# Patient Record
Sex: MALE | Race: WHITE | URBAN - NONMETROPOLITAN AREA
[De-identification: names, ages, dates, MRNs, and addresses within clinical notes are randomized per-mention and may not be internally consistent; named-entity substitution may affect disease eponyms.]

---

## 2022-07-18 ENCOUNTER — OFFICE VISIT (OUTPATIENT)
Dept: URBAN - NONMETROPOLITAN AREA CLINIC 2 | Facility: CLINIC | Age: 42
End: 2022-07-18

## 2022-10-24 ENCOUNTER — TELEPHONE ENCOUNTER (OUTPATIENT)
Dept: URBAN - NONMETROPOLITAN AREA CLINIC 2 | Facility: CLINIC | Age: 42
End: 2022-10-24

## 2022-11-17 ENCOUNTER — LAB OUTSIDE AN ENCOUNTER (OUTPATIENT)
Dept: URBAN - NONMETROPOLITAN AREA CLINIC 2 | Facility: CLINIC | Age: 42
End: 2022-11-17

## 2022-11-17 ENCOUNTER — WEB ENCOUNTER (OUTPATIENT)
Dept: URBAN - NONMETROPOLITAN AREA CLINIC 2 | Facility: CLINIC | Age: 42
End: 2022-11-17

## 2022-11-17 ENCOUNTER — OFFICE VISIT (OUTPATIENT)
Dept: URBAN - NONMETROPOLITAN AREA CLINIC 2 | Facility: CLINIC | Age: 42
End: 2022-11-17
Payer: COMMERCIAL

## 2022-11-17 ENCOUNTER — TELEPHONE ENCOUNTER (OUTPATIENT)
Dept: URBAN - NONMETROPOLITAN AREA CLINIC 2 | Facility: CLINIC | Age: 42
End: 2022-11-17

## 2022-11-17 VITALS
HEART RATE: 94 BPM | TEMPERATURE: 97.9 F | SYSTOLIC BLOOD PRESSURE: 155 MMHG | DIASTOLIC BLOOD PRESSURE: 93 MMHG | HEIGHT: 70 IN | WEIGHT: 285 LBS | BODY MASS INDEX: 40.8 KG/M2

## 2022-11-17 DIAGNOSIS — R15.9 FULL INCONTINENCE OF FECES: ICD-10-CM

## 2022-11-17 DIAGNOSIS — Z12.11 COLON CANCER SCREENING: ICD-10-CM

## 2022-11-17 DIAGNOSIS — C62.90 MALIGNANT NEOPLASM OF TESTICLE, UNSPECIFIED LATERALITY, UNSPECIFIED WHETHER DESCENDED OR UNDESCENDED: ICD-10-CM

## 2022-11-17 PROBLEM — 363449006: Status: ACTIVE | Noted: 2022-11-17

## 2022-11-17 PROCEDURE — 99244 OFF/OP CNSLTJ NEW/EST MOD 40: CPT | Performed by: NURSE PRACTITIONER

## 2022-11-17 RX ORDER — LISINOPRIL 20 MG/1
1 TABLET TABLET ORAL ONCE A DAY
Status: ACTIVE | COMMUNITY

## 2022-11-17 RX ORDER — DEXTROAMPHETAMINE SACCHARATE, AMPHETAMINE ASPARTATE, DEXTROAMPHETAMINE SULFATE, AND AMPHETAMINE SULFATE 5; 5; 5; 5 MG/1; MG/1; MG/1; MG/1
1 TABLET TABLET ORAL TWICE A DAY
Status: ACTIVE | COMMUNITY

## 2022-11-17 RX ORDER — DEXTROAMPHETAMINE SACCHARATE, AMPHETAMINE ASPARTATE MONOHYDRATE, DEXTROAMPHETAMINE SULFATE, AND AMPHETAMINE SULFATE 5; 5; 5; 5 MG/1; MG/1; MG/1; MG/1
CAPSULE, EXTENDED RELEASE ORAL
Qty: 30 CAPSULE | Status: ACTIVE | COMMUNITY

## 2022-11-17 RX ORDER — SODIUM, POTASSIUM,MAG SULFATES 17.5-3.13G
354 ML AS DIRECTED SOLUTION, RECONSTITUTED, ORAL ORAL ONCE
Qty: 354 MILLILITER | Refills: 0 | OUTPATIENT
Start: 2022-11-17 | End: 2022-11-18

## 2022-11-17 RX ORDER — DEXTROAMPHETAMINE SACCHARATE, AMPHETAMINE ASPARTATE, DEXTROAMPHETAMINE SULFATE, AND AMPHETAMINE SULFATE 2.5; 2.5; 2.5; 2.5 MG/1; MG/1; MG/1; MG/1
1 TABLET TABLET ORAL TWICE A DAY
Status: ACTIVE | COMMUNITY

## 2022-11-17 NOTE — HPI-TODAY'S VISIT:
11/17/2022 Lewis is a 42-year-old male referred to me for consultation of diarrhea and fecal incontinence.  A copy this note be sent to the referring physician.  He states last year he was diagnosed and treated for testicular cancer.  During his treatment and evaluation he underwent retroperitoneal lymph node dissection with perianal resection.  Since his surgery, he has had fecal smearing.  He has intermittent urgency with diarrhea and incontinence at times.  Sometimes will be sitting at his desk with formed stool incontinence.  He is never had a colonoscopy.  He does not take anything for his bowels.  He has cut back on the amount of caffeine he was drinking.  Today his main complaint is fecal incontinence.  MB

## 2022-11-18 LAB
A/G RATIO: 1.5
ABSOLUTE BASOPHILS: 30
ABSOLUTE EOSINOPHILS: 89
ABSOLUTE LYMPHOCYTES: 1404
ABSOLUTE MONOCYTES: 507
ABSOLUTE NEUTROPHILS: 3870
ALBUMIN: 4.3
ALKALINE PHOSPHATASE: 59
ALT (SGPT): 52
AST (SGOT): 22
BASOPHILS: 0.5
BILIRUBIN, TOTAL: 0.5
BUN/CREATININE RATIO: 12
BUN: 19
C-REACTIVE PROTEIN, QUANT: 18
CALCIUM: 9.4
CARBON DIOXIDE, TOTAL: 27
CHLORIDE: 102
CREATININE: 1.65
EGFR: 52
EOSINOPHILS: 1.5
GLOBULIN, TOTAL: 2.8
GLUCOSE: 70
HEMATOCRIT: 39.6
HEMOGLOBIN: 13.7
IMMUNOGLOBULIN A: 240
INTERPRETATION: (no result)
LYMPHOCYTES: 23.8
MCH: 32.2
MCHC: 34.6
MCV: 93
MONOCYTES: 8.6
MPV: 9.4
NEUTROPHILS: 65.6
PLATELET COUNT: 232
POTASSIUM: 4.7
PROTEIN, TOTAL: 7.1
RDW: 12.3
RED BLOOD CELL COUNT: 4.26
SED RATE BY MODIFIED: 31
SODIUM: 139
TISSUE TRANSGLUTAMINASE AB, IGA: <1
TSH: 2.37
WHITE BLOOD CELL COUNT: 5.9

## 2022-11-21 ENCOUNTER — LAB OUTSIDE AN ENCOUNTER (OUTPATIENT)
Dept: URBAN - METROPOLITAN AREA CLINIC 92 | Facility: CLINIC | Age: 42
End: 2022-11-21

## 2022-11-21 ENCOUNTER — TELEPHONE ENCOUNTER (OUTPATIENT)
Dept: URBAN - METROPOLITAN AREA CLINIC 92 | Facility: CLINIC | Age: 42
End: 2022-11-21

## 2022-12-06 ENCOUNTER — LAB OUTSIDE AN ENCOUNTER (OUTPATIENT)
Dept: URBAN - NONMETROPOLITAN AREA CLINIC 2 | Facility: CLINIC | Age: 42
End: 2022-12-06

## 2022-12-07 LAB
IMMUNOGLOBULIN A: 223
INTERPRETATION: (no result)
SED RATE BY MODIFIED: 6
TISSUE TRANSGLUTAMINASE AB, IGA: <1

## 2023-02-22 ENCOUNTER — TELEPHONE ENCOUNTER (OUTPATIENT)
Dept: URBAN - NONMETROPOLITAN AREA CLINIC 2 | Facility: CLINIC | Age: 43
End: 2023-02-22

## 2023-02-24 ENCOUNTER — OFFICE VISIT (OUTPATIENT)
Dept: URBAN - NONMETROPOLITAN AREA SURGERY CENTER 1 | Facility: SURGERY CENTER | Age: 43
End: 2023-02-24
Payer: COMMERCIAL

## 2023-02-24 ENCOUNTER — CLAIMS CREATED FROM THE CLAIM WINDOW (OUTPATIENT)
Dept: URBAN - METROPOLITAN AREA CLINIC 4 | Facility: CLINIC | Age: 43
End: 2023-02-24
Payer: COMMERCIAL

## 2023-02-24 DIAGNOSIS — R19.4 ALTERATION IN BOWEL ELIMINATION: ICD-10-CM

## 2023-02-24 DIAGNOSIS — K63.89 OTHER SPECIFIED DISEASES OF INTESTINE: ICD-10-CM

## 2023-02-24 DIAGNOSIS — D12.2 ADENOMA OF ASCENDING COLON: ICD-10-CM

## 2023-02-24 PROCEDURE — 45380 COLONOSCOPY AND BIOPSY: CPT | Performed by: INTERNAL MEDICINE

## 2023-02-24 PROCEDURE — 88342 IMHCHEM/IMCYTCHM 1ST ANTB: CPT | Performed by: PATHOLOGY

## 2023-02-24 PROCEDURE — G8907 PT DOC NO EVENTS ON DISCHARG: HCPCS | Performed by: INTERNAL MEDICINE

## 2023-02-24 PROCEDURE — 88313 SPECIAL STAINS GROUP 2: CPT | Performed by: PATHOLOGY

## 2023-02-24 PROCEDURE — 88305 TISSUE EXAM BY PATHOLOGIST: CPT | Performed by: PATHOLOGY

## 2023-02-24 PROCEDURE — 45385 COLONOSCOPY W/LESION REMOVAL: CPT | Performed by: INTERNAL MEDICINE

## 2023-05-11 ENCOUNTER — OFFICE VISIT (OUTPATIENT)
Dept: URBAN - NONMETROPOLITAN AREA CLINIC 2 | Facility: CLINIC | Age: 43
End: 2023-05-11
Payer: COMMERCIAL

## 2023-05-11 VITALS
HEIGHT: 70 IN | WEIGHT: 280 LBS | DIASTOLIC BLOOD PRESSURE: 87 MMHG | HEART RATE: 99 BPM | TEMPERATURE: 98.6 F | SYSTOLIC BLOOD PRESSURE: 128 MMHG | BODY MASS INDEX: 40.09 KG/M2

## 2023-05-11 DIAGNOSIS — R15.9 FULL INCONTINENCE OF FECES: ICD-10-CM

## 2023-05-11 DIAGNOSIS — C62.90 MALIGNANT NEOPLASM OF TESTICLE, UNSPECIFIED LATERALITY, UNSPECIFIED WHETHER DESCENDED OR UNDESCENDED: ICD-10-CM

## 2023-05-11 DIAGNOSIS — R74.8 ELEVATED LIVER ENZYMES: ICD-10-CM

## 2023-05-11 DIAGNOSIS — Z12.11 COLON CANCER SCREENING: ICD-10-CM

## 2023-05-11 PROBLEM — 305058001: Status: ACTIVE | Noted: 2022-11-17

## 2023-05-11 PROCEDURE — 99214 OFFICE O/P EST MOD 30 MIN: CPT | Performed by: NURSE PRACTITIONER

## 2023-05-11 RX ORDER — DEXTROAMPHETAMINE SACCHARATE, AMPHETAMINE ASPARTATE, DEXTROAMPHETAMINE SULFATE, AND AMPHETAMINE SULFATE 2.5; 2.5; 2.5; 2.5 MG/1; MG/1; MG/1; MG/1
1 TABLET TABLET ORAL TWICE A DAY
Status: ACTIVE | COMMUNITY

## 2023-05-11 RX ORDER — DEXTROAMPHETAMINE SACCHARATE, AMPHETAMINE ASPARTATE, DEXTROAMPHETAMINE SULFATE, AND AMPHETAMINE SULFATE 5; 5; 5; 5 MG/1; MG/1; MG/1; MG/1
1 TABLET TABLET ORAL TWICE A DAY
Status: ACTIVE | COMMUNITY

## 2023-05-11 RX ORDER — LISINOPRIL 20 MG/1
1 TABLET TABLET ORAL ONCE A DAY
Status: ACTIVE | COMMUNITY

## 2023-05-11 RX ORDER — DEXTROAMPHETAMINE SACCHARATE, AMPHETAMINE ASPARTATE MONOHYDRATE, DEXTROAMPHETAMINE SULFATE, AND AMPHETAMINE SULFATE 5; 5; 5; 5 MG/1; MG/1; MG/1; MG/1
CAPSULE, EXTENDED RELEASE ORAL
Qty: 30 CAPSULE | Status: ACTIVE | COMMUNITY

## 2023-05-11 NOTE — HPI-TODAY'S VISIT:
11/17/2022 Lewis is a 42-year-old male referred to me for consultation of diarrhea and fecal incontinence.  A copy this note be sent to the referring physician.  He states last year he was diagnosed and treated for testicular cancer.  During his treatment and evaluation he underwent retroperitoneal lymph node dissection with perianal resection.  Since his surgery, he has had fecal smearing.  He has intermittent urgency with diarrhea and incontinence at times.  Sometimes will be sitting at his desk with formed stool incontinence.  He is never had a colonoscopy.  He does not take anything for his bowels.  He has cut back on the amount of caffeine he was drinking.  Today his main complaint is fecal incontinence.  MB 5/11/2023 Lewis presents for follow-up of colonoscopy.  His colonoscopy reveals 1 tubular adenoma.  He will be due for repeat in 5 years.  His ALT was mildly elevated.  He had contrasted CT scan in February in 2022 with diffuse fatty liver.  Today he agrees to repeat labs along with serologies.  I suspect this is due to fatty liver.  We have discussed losing weight.  His bowels have improved tremendously on Florastor, he has not been taking the fiber regularly.  Using the bidet for cleaning has helped.  His incontinence has improved significantly.  He is scheduled to see the pelvic floor therapist as well.  Today he is doing well otherwise with no new GI complaints.  MB

## 2023-05-18 ENCOUNTER — TELEPHONE ENCOUNTER (OUTPATIENT)
Dept: URBAN - NONMETROPOLITAN AREA CLINIC 2 | Facility: CLINIC | Age: 43
End: 2023-05-18

## 2023-05-18 LAB
A/G RATIO: 1.3
ABSOLUTE BASOPHILS: 28
ABSOLUTE EOSINOPHILS: 61
ABSOLUTE LYMPHOCYTES: 1264
ABSOLUTE MONOCYTES: 315
ABSOLUTE NEUTROPHILS: 3032
ACTIN (SMOOTH MUSCLE) ANTIBODY (IGG): <20
ALBUMIN: 4
ALKALINE PHOSPHATASE: 61
ALPHA-1-ANTITRYPSIN QN: 134
ALT (SGPT): 52
ANA SCREEN, IFA: NEGATIVE
AST (SGOT): 24
BASOPHILS: 0.6
BILIRUBIN, TOTAL: 0.4
BUN/CREATININE RATIO: 11
BUN: 16
CALCIUM: 9.2
CARBON DIOXIDE, TOTAL: 29
CHLORIDE: 101
CREATININE: 1.46
EGFR: 60
EOSINOPHILS: 1.3
FERRITIN, SERUM: 138
GGT: 32
GLOBULIN, TOTAL: 3
GLUCOSE: 133
HEMATOCRIT: 40.4
HEMOGLOBIN: 13.7
IRON BIND.CAP.(TIBC): 317
IRON SATURATION: 30
IRON: 96
LYMPHOCYTES: 26.9
MCH: 32.3
MCHC: 33.9
MCV: 95.3
MITOCHONDRIAL (M2) ANTIBODY: <=20
MONOCYTES: 6.7
MPV: 9.2
NEUTROPHILS: 64.5
PLATELET COUNT: 183
POTASSIUM: 4.6
PROTEIN, TOTAL: 7
RDW: 12.3
RED BLOOD CELL COUNT: 4.24
SODIUM: 139
WHITE BLOOD CELL COUNT: 4.7

## 2023-07-17 ENCOUNTER — TELEPHONE ENCOUNTER (OUTPATIENT)
Dept: URBAN - NONMETROPOLITAN AREA CLINIC 2 | Facility: CLINIC | Age: 43
End: 2023-07-17

## 2023-11-09 ENCOUNTER — OFFICE VISIT (OUTPATIENT)
Dept: URBAN - NONMETROPOLITAN AREA CLINIC 2 | Facility: CLINIC | Age: 43
End: 2023-11-09
Payer: COMMERCIAL

## 2023-11-09 VITALS
DIASTOLIC BLOOD PRESSURE: 79 MMHG | SYSTOLIC BLOOD PRESSURE: 128 MMHG | BODY MASS INDEX: 41.6 KG/M2 | WEIGHT: 290.6 LBS | HEIGHT: 70 IN | TEMPERATURE: 98.1 F | HEART RATE: 103 BPM

## 2023-11-09 DIAGNOSIS — C62.90 MALIGNANT NEOPLASM OF TESTICLE, UNSPECIFIED LATERALITY, UNSPECIFIED WHETHER DESCENDED OR UNDESCENDED: ICD-10-CM

## 2023-11-09 DIAGNOSIS — R15.9 FULL INCONTINENCE OF FECES: ICD-10-CM

## 2023-11-09 DIAGNOSIS — R74.8 ELEVATED LIVER ENZYMES: ICD-10-CM

## 2023-11-09 DIAGNOSIS — Z12.11 COLON CANCER SCREENING: ICD-10-CM

## 2023-11-09 PROBLEM — 1086911000119107: Status: ACTIVE | Noted: 2022-11-17

## 2023-11-09 PROBLEM — 707724006: Status: ACTIVE | Noted: 2022-11-21

## 2023-11-09 PROCEDURE — 99214 OFFICE O/P EST MOD 30 MIN: CPT | Performed by: NURSE PRACTITIONER

## 2023-11-09 RX ORDER — DEXTROAMPHETAMINE SACCHARATE, AMPHETAMINE ASPARTATE, DEXTROAMPHETAMINE SULFATE, AND AMPHETAMINE SULFATE 2.5; 2.5; 2.5; 2.5 MG/1; MG/1; MG/1; MG/1
1 TABLET TABLET ORAL TWICE A DAY
Status: ACTIVE | COMMUNITY

## 2023-11-09 RX ORDER — DEXTROAMPHETAMINE SACCHARATE, AMPHETAMINE ASPARTATE, DEXTROAMPHETAMINE SULFATE, AND AMPHETAMINE SULFATE 5; 5; 5; 5 MG/1; MG/1; MG/1; MG/1
1 TABLET TABLET ORAL TWICE A DAY
Status: ACTIVE | COMMUNITY

## 2023-11-09 RX ORDER — DEXTROAMPHETAMINE SACCHARATE, AMPHETAMINE ASPARTATE MONOHYDRATE, DEXTROAMPHETAMINE SULFATE, AND AMPHETAMINE SULFATE 5; 5; 5; 5 MG/1; MG/1; MG/1; MG/1
CAPSULE, EXTENDED RELEASE ORAL
Qty: 30 CAPSULE | Status: ACTIVE | COMMUNITY

## 2023-11-09 RX ORDER — LISINOPRIL 20 MG/1
1 TABLET TABLET ORAL ONCE A DAY
Status: ACTIVE | COMMUNITY

## 2023-11-09 NOTE — HPI-TODAY'S VISIT:
11/17/2022 Lewis is a 42-year-old male referred to me for consultation of diarrhea and fecal incontinence.  A copy this note be sent to the referring physician.  He states last year he was diagnosed and treated for testicular cancer.  During his treatment and evaluation he underwent retroperitoneal lymph node dissection with perianal resection.  Since his surgery, he has had fecal smearing.  He has intermittent urgency with diarrhea and incontinence at times.  Sometimes will be sitting at his desk with formed stool incontinence.  He is never had a colonoscopy.  He does not take anything for his bowels.  He has cut back on the amount of caffeine he was drinking.  Today his main complaint is fecal incontinence.  MB 5/11/2023 Lewis presents for follow-up of colonoscopy.  His colonoscopy reveals 1 tubular adenoma.  He will be due for repeat in 5 years.  His ALT was mildly elevated.  He had contrasted CT scan in February in 2022 with diffuse fatty liver.  Today he agrees to repeat labs along with serologies.  I suspect this is due to fatty liver.  We have discussed losing weight.  His bowels have improved tremendously on Florastor, he has not been taking the fiber regularly.  Using the bidet for cleaning has helped.  His incontinence has improved significantly.  He is scheduled to see the pelvic floor therapist as well.  Today he is doing well otherwise with no new GI complaints.  MB 11/9/2023 Lewis presents for follow-up of fecal incontinence and fatty liver.  His bowels have normalized on Florastor daily a month pelvic floor therapy.  He is working on weight loss, his ALT was still mildly elevated at last visit, his serologies are negative.  Today he is doing well otherwise.  MB

## 2024-05-09 ENCOUNTER — DASHBOARD ENCOUNTERS (OUTPATIENT)
Age: 44
End: 2024-05-09

## 2024-05-09 ENCOUNTER — LAB OUTSIDE AN ENCOUNTER (OUTPATIENT)
Dept: URBAN - NONMETROPOLITAN AREA CLINIC 2 | Facility: CLINIC | Age: 44
End: 2024-05-09

## 2024-05-09 ENCOUNTER — OFFICE VISIT (OUTPATIENT)
Dept: URBAN - NONMETROPOLITAN AREA CLINIC 2 | Facility: CLINIC | Age: 44
End: 2024-05-09
Payer: COMMERCIAL

## 2024-05-09 VITALS
BODY MASS INDEX: 41.17 KG/M2 | HEART RATE: 84 BPM | DIASTOLIC BLOOD PRESSURE: 76 MMHG | HEIGHT: 70 IN | SYSTOLIC BLOOD PRESSURE: 149 MMHG | WEIGHT: 287.6 LBS

## 2024-05-09 DIAGNOSIS — C62.90 MALIGNANT NEOPLASM OF TESTICLE, UNSPECIFIED LATERALITY, UNSPECIFIED WHETHER DESCENDED OR UNDESCENDED: ICD-10-CM

## 2024-05-09 DIAGNOSIS — K75.81 METABOLIC DYSFUNCTION-ASSOCIATED STEATOHEPATITIS (MASH): ICD-10-CM

## 2024-05-09 DIAGNOSIS — Z12.11 COLON CANCER SCREENING: ICD-10-CM

## 2024-05-09 DIAGNOSIS — R15.9 FULL INCONTINENCE OF FECES: ICD-10-CM

## 2024-05-09 DIAGNOSIS — R74.8 ELEVATED LIVER ENZYMES: ICD-10-CM

## 2024-05-09 PROBLEM — 442191002: Status: ACTIVE | Noted: 2024-05-09

## 2024-05-09 PROCEDURE — 99214 OFFICE O/P EST MOD 30 MIN: CPT | Performed by: NURSE PRACTITIONER

## 2024-05-09 RX ORDER — DEXTROAMPHETAMINE SACCHARATE, AMPHETAMINE ASPARTATE MONOHYDRATE, DEXTROAMPHETAMINE SULFATE, AND AMPHETAMINE SULFATE 5; 5; 5; 5 MG/1; MG/1; MG/1; MG/1
CAPSULE, EXTENDED RELEASE ORAL
Qty: 30 CAPSULE | Status: ACTIVE | COMMUNITY

## 2024-05-09 RX ORDER — ATORVASTATIN CALCIUM 20 MG/1
TABLET, FILM COATED ORAL
Qty: 30 TABLET | Status: ACTIVE | COMMUNITY

## 2024-05-09 RX ORDER — DEXTROAMPHETAMINE SACCHARATE, AMPHETAMINE ASPARTATE, DEXTROAMPHETAMINE SULFATE, AND AMPHETAMINE SULFATE 2.5; 2.5; 2.5; 2.5 MG/1; MG/1; MG/1; MG/1
1 TABLET TABLET ORAL TWICE A DAY
Status: ACTIVE | COMMUNITY

## 2024-05-09 RX ORDER — DEXTROAMPHETAMINE SACCHARATE, AMPHETAMINE ASPARTATE, DEXTROAMPHETAMINE SULFATE, AND AMPHETAMINE SULFATE 5; 5; 5; 5 MG/1; MG/1; MG/1; MG/1
1 TABLET TABLET ORAL TWICE A DAY
Status: ACTIVE | COMMUNITY

## 2024-05-09 RX ORDER — TIRZEPATIDE 2.5 MG/.5ML
INJECTION, SOLUTION SUBCUTANEOUS
Qty: 2 UNSPECIFIED | Status: ACTIVE | COMMUNITY

## 2024-05-09 RX ORDER — LISINOPRIL 30 MG/1
1 TABLET TABLET ORAL ONCE A DAY
Status: ACTIVE | COMMUNITY

## 2024-05-10 ENCOUNTER — TELEPHONE ENCOUNTER (OUTPATIENT)
Dept: URBAN - NONMETROPOLITAN AREA CLINIC 2 | Facility: CLINIC | Age: 44
End: 2024-05-10

## 2024-05-10 LAB
A/G RATIO: 1.5
ABSOLUTE BASOPHILS: 29
ABSOLUTE EOSINOPHILS: 120
ABSOLUTE LYMPHOCYTES: 1938
ABSOLUTE MONOCYTES: 422
ABSOLUTE NEUTROPHILS: 3192
ALBUMIN: 4.2
ALKALINE PHOSPHATASE: 58
ALT (SGPT): 59
AST (SGOT): 26
BASOPHILS: 0.5
BILIRUBIN, TOTAL: 0.5
BUN/CREATININE RATIO: 11
BUN: 15
CALCIUM: 9.6
CARBON DIOXIDE, TOTAL: 28
CHLORIDE: 102
CREATININE: 1.37
EGFR: 64
EOSINOPHILS: 2.1
GLOBULIN, TOTAL: 2.8
GLUCOSE: 96
HEMATOCRIT: 42
HEMOGLOBIN: 14.3
LYMPHOCYTES: 34
MCH: 32.6
MCHC: 34
MCV: 95.7
MONOCYTES: 7.4
MPV: 9
NEUTROPHILS: 56
PLATELET COUNT: 214
POTASSIUM: 4.3
PROTEIN, TOTAL: 7
RDW: 12.8
RED BLOOD CELL COUNT: 4.39
SODIUM: 139
WHITE BLOOD CELL COUNT: 5.7

## 2024-11-07 ENCOUNTER — OFFICE VISIT (OUTPATIENT)
Dept: URBAN - NONMETROPOLITAN AREA CLINIC 2 | Facility: CLINIC | Age: 44
End: 2024-11-07
Payer: COMMERCIAL

## 2024-11-07 ENCOUNTER — LAB OUTSIDE AN ENCOUNTER (OUTPATIENT)
Dept: URBAN - NONMETROPOLITAN AREA CLINIC 2 | Facility: CLINIC | Age: 44
End: 2024-11-07

## 2024-11-07 VITALS
HEIGHT: 70 IN | SYSTOLIC BLOOD PRESSURE: 139 MMHG | WEIGHT: 261.4 LBS | DIASTOLIC BLOOD PRESSURE: 80 MMHG | HEART RATE: 113 BPM | BODY MASS INDEX: 37.42 KG/M2

## 2024-11-07 DIAGNOSIS — R74.8 ELEVATED LIVER ENZYMES: ICD-10-CM

## 2024-11-07 DIAGNOSIS — Z12.11 COLON CANCER SCREENING: ICD-10-CM

## 2024-11-07 DIAGNOSIS — R15.9 FULL INCONTINENCE OF FECES: ICD-10-CM

## 2024-11-07 DIAGNOSIS — K75.81 METABOLIC DYSFUNCTION-ASSOCIATED STEATOHEPATITIS (MASH): ICD-10-CM

## 2024-11-07 DIAGNOSIS — C62.90 MALIGNANT NEOPLASM OF TESTICLE, UNSPECIFIED LATERALITY, UNSPECIFIED WHETHER DESCENDED OR UNDESCENDED: ICD-10-CM

## 2024-11-07 PROCEDURE — 99214 OFFICE O/P EST MOD 30 MIN: CPT | Performed by: NURSE PRACTITIONER

## 2024-11-07 RX ORDER — TIRZEPATIDE 7.5 MG/.5ML
AS DIRECTED INJECTION, SOLUTION SUBCUTANEOUS
Status: ACTIVE | COMMUNITY

## 2024-11-07 RX ORDER — TESTOSTERONE 10 MG/.5G
1 PUMP TO SKIN IN THE MORNING TO THE THIGHS GEL, METERED TOPICAL ONCE A DAY
Status: ACTIVE | COMMUNITY

## 2024-11-07 RX ORDER — DEXTROAMPHETAMINE SACCHARATE, AMPHETAMINE ASPARTATE MONOHYDRATE, DEXTROAMPHETAMINE SULFATE, AND AMPHETAMINE SULFATE 5; 5; 5; 5 MG/1; MG/1; MG/1; MG/1
CAPSULE, EXTENDED RELEASE ORAL
Qty: 30 CAPSULE | Status: ACTIVE | COMMUNITY

## 2024-11-07 RX ORDER — DEXTROAMPHETAMINE SACCHARATE, AMPHETAMINE ASPARTATE, DEXTROAMPHETAMINE SULFATE, AND AMPHETAMINE SULFATE 5; 5; 5; 5 MG/1; MG/1; MG/1; MG/1
1 TABLET TABLET ORAL TWICE A DAY
Status: ACTIVE | COMMUNITY

## 2024-11-07 RX ORDER — DEXTROAMPHETAMINE SACCHARATE, AMPHETAMINE ASPARTATE, DEXTROAMPHETAMINE SULFATE, AND AMPHETAMINE SULFATE 2.5; 2.5; 2.5; 2.5 MG/1; MG/1; MG/1; MG/1
1 TABLET TABLET ORAL TWICE A DAY
Status: ACTIVE | COMMUNITY

## 2024-11-07 RX ORDER — ATORVASTATIN CALCIUM 20 MG/1
TABLET, FILM COATED ORAL
Qty: 30 TABLET | Status: ACTIVE | COMMUNITY

## 2024-11-07 RX ORDER — LISINOPRIL 30 MG/1
1 TABLET TABLET ORAL ONCE A DAY
Status: ACTIVE | COMMUNITY

## 2024-11-07 NOTE — HPI-TODAY'S VISIT:
11/17/2022 Lewis is a 42-year-old male referred to me for consultation of diarrhea and fecal incontinence.  A copy this note be sent to the referring physician.  He states last year he was diagnosed and treated for testicular cancer.  During his treatment and evaluation he underwent retroperitoneal lymph node dissection with perianal resection.  Since his surgery, he has had fecal smearing.  He has intermittent urgency with diarrhea and incontinence at times.  Sometimes will be sitting at his desk with formed stool incontinence.  He is never had a colonoscopy.  He does not take anything for his bowels.  He has cut back on the amount of caffeine he was drinking.  Today his main complaint is fecal incontinence.  MB 5/11/2023 Lewis presents for follow-up of colonoscopy.  His colonoscopy reveals 1 tubular adenoma.  He will be due for repeat in 5 years.  His ALT was mildly elevated.  He had contrasted CT scan in February in 2022 with diffuse fatty liver.  Today he agrees to repeat labs along with serologies.  I suspect this is due to fatty liver.  We have discussed losing weight.  His bowels have improved tremendously on Florastor, he has not been taking the fiber regularly.  Using the bidet for cleaning has helped.  His incontinence has improved significantly.  He is scheduled to see the pelvic floor therapist as well.  Today he is doing well otherwise with no new GI complaints.  MB 11/9/2023 Lewis presents for follow-up of fecal incontinence and fatty liver.  His bowels have normalized on Florastor daily a month pelvic floor therapy.  He is working on weight loss, his ALT was still mildly elevated at last visit, his serologies are negative.  Today he is doing well otherwise.  MB 5/9/2024 Lewis presents for follow-up of fecal incontinence and fatty liver.  Florastor and psyllium along with pelvic floor therapy have resolved his fecal incontinence.  His elevated liver enzymes are likely due to fatty liver with negative serologies and fatty deposition on imaging.  Today we have discussed therapy.  He is starting Mounjaro for weight loss.  We will check his labs and a FibroScan, if he has F2 or F3 would consider Rezdiffra , however we may follow his weight loss on Mounjaro first.  MB 11/7/2024 The patient presents for follow-up of nonalcoholic fatty liver disease.  Since his last visit he has not had the fibroscan. He continues to work on healthy weight loss with Mounjaro.  Today denies any new GI complaints.  He continues low-dose psyllium with no further incontinence.  MB

## 2024-11-08 ENCOUNTER — TELEPHONE ENCOUNTER (OUTPATIENT)
Dept: URBAN - NONMETROPOLITAN AREA CLINIC 2 | Facility: CLINIC | Age: 44
End: 2024-11-08

## 2024-11-08 LAB
A/G RATIO: 1.5
ABSOLUTE BASOPHILS: 43
ABSOLUTE EOSINOPHILS: 108
ABSOLUTE LYMPHOCYTES: 1886
ABSOLUTE MONOCYTES: 598
ABSOLUTE NEUTROPHILS: 4565
ALBUMIN: 4.5
ALKALINE PHOSPHATASE: 68
ALT (SGPT): 43
AST (SGOT): 22
BASOPHILS: 0.6
BILIRUBIN, TOTAL: 0.8
BUN/CREATININE RATIO: 7
BUN: 10
CALCIUM: 9.7
CARBON DIOXIDE, TOTAL: 28
CHLORIDE: 100
CREATININE: 1.49
EGFR: 58
EOSINOPHILS: 1.5
GLOBULIN, TOTAL: 3.1
GLUCOSE: 68
HEMATOCRIT: 51.9
HEMOGLOBIN: 17.2
LYMPHOCYTES: 26.2
MCH: 31.3
MCHC: 33.1
MCV: 94.4
MONOCYTES: 8.3
MPV: 9.3
NEUTROPHILS: 63.4
PLATELET COUNT: 252
POTASSIUM: 4.4
PROTEIN, TOTAL: 7.6
RDW: 12.1
RED BLOOD CELL COUNT: 5.5
SODIUM: 138
WHITE BLOOD CELL COUNT: 7.2

## 2025-05-08 ENCOUNTER — LAB OUTSIDE AN ENCOUNTER (OUTPATIENT)
Dept: URBAN - NONMETROPOLITAN AREA CLINIC 2 | Facility: CLINIC | Age: 45
End: 2025-05-08

## 2025-05-08 ENCOUNTER — OFFICE VISIT (OUTPATIENT)
Dept: URBAN - NONMETROPOLITAN AREA CLINIC 2 | Facility: CLINIC | Age: 45
End: 2025-05-08
Payer: COMMERCIAL

## 2025-05-08 DIAGNOSIS — R15.9 FULL INCONTINENCE OF FECES: ICD-10-CM

## 2025-05-08 DIAGNOSIS — R74.8 ELEVATED LIVER ENZYMES: ICD-10-CM

## 2025-05-08 DIAGNOSIS — C62.90 MALIGNANT NEOPLASM OF TESTICLE, UNSPECIFIED LATERALITY, UNSPECIFIED WHETHER DESCENDED OR UNDESCENDED: ICD-10-CM

## 2025-05-08 DIAGNOSIS — K75.81 METABOLIC DYSFUNCTION-ASSOCIATED STEATOHEPATITIS (MASH): ICD-10-CM

## 2025-05-08 DIAGNOSIS — Z12.11 COLON CANCER SCREENING: ICD-10-CM

## 2025-05-08 PROCEDURE — 99214 OFFICE O/P EST MOD 30 MIN: CPT | Performed by: NURSE PRACTITIONER

## 2025-05-08 RX ORDER — ATORVASTATIN CALCIUM 20 MG/1
TABLET, FILM COATED ORAL
Qty: 30 TABLET | Status: ACTIVE | COMMUNITY

## 2025-05-08 RX ORDER — TESTOSTERONE 10 MG/.5G
1 PUMP TO SKIN IN THE MORNING TO THE THIGHS GEL, METERED TOPICAL ONCE A DAY
Status: ACTIVE | COMMUNITY

## 2025-05-08 RX ORDER — DEXTROAMPHETAMINE SACCHARATE, AMPHETAMINE ASPARTATE, DEXTROAMPHETAMINE SULFATE, AND AMPHETAMINE SULFATE 2.5; 2.5; 2.5; 2.5 MG/1; MG/1; MG/1; MG/1
1 TABLET TABLET ORAL TWICE A DAY
Status: ACTIVE | COMMUNITY

## 2025-05-08 RX ORDER — DEXTROAMPHETAMINE SACCHARATE, AMPHETAMINE ASPARTATE, DEXTROAMPHETAMINE SULFATE, AND AMPHETAMINE SULFATE 5; 5; 5; 5 MG/1; MG/1; MG/1; MG/1
1 TABLET TABLET ORAL TWICE A DAY
Status: ACTIVE | COMMUNITY

## 2025-05-08 RX ORDER — LISINOPRIL 30 MG/1
1 TABLET TABLET ORAL ONCE A DAY
Status: ACTIVE | COMMUNITY

## 2025-05-08 RX ORDER — TIRZEPATIDE 7.5 MG/.5ML
AS DIRECTED INJECTION, SOLUTION SUBCUTANEOUS
Status: ACTIVE | COMMUNITY

## 2025-05-08 RX ORDER — DEXTROAMPHETAMINE SACCHARATE, AMPHETAMINE ASPARTATE MONOHYDRATE, DEXTROAMPHETAMINE SULFATE, AMPHETAMINE SULFATE 5; 5; 5; 5 MG/1; MG/1; MG/1; MG/1
CAPSULE, EXTENDED RELEASE ORAL
Qty: 30 CAPSULE | Status: ACTIVE | COMMUNITY

## 2025-05-08 NOTE — HPI-TODAY'S VISIT:
11/17/2022 Lewis is a 42-year-old male referred to me for consultation of diarrhea and fecal incontinence.  A copy this note be sent to the referring physician.  He states last year he was diagnosed and treated for testicular cancer.  During his treatment and evaluation he underwent retroperitoneal lymph node dissection with perianal resection.  Since his surgery, he has had fecal smearing.  He has intermittent urgency with diarrhea and incontinence at times.  Sometimes will be sitting at his desk with formed stool incontinence.  He is never had a colonoscopy.  He does not take anything for his bowels.  He has cut back on the amount of caffeine he was drinking.  Today his main complaint is fecal incontinence.  MB 5/11/2023 Lewis presents for follow-up of colonoscopy.  His colonoscopy reveals 1 tubular adenoma.  He will be due for repeat in 5 years.  His ALT was mildly elevated.  He had contrasted CT scan in February in 2022 with diffuse fatty liver.  Today he agrees to repeat labs along with serologies.  I suspect this is due to fatty liver.  We have discussed losing weight.  His bowels have improved tremendously on Florastor, he has not been taking the fiber regularly.  Using the bidet for cleaning has helped.  His incontinence has improved significantly.  He is scheduled to see the pelvic floor therapist as well.  Today he is doing well otherwise with no new GI complaints.  MB 11/9/2023 Lewis presents for follow-up of fecal incontinence and fatty liver.  His bowels have normalized on Florastor daily a month pelvic floor therapy.  He is working on weight loss, his ALT was still mildly elevated at last visit, his serologies are negative.  Today he is doing well otherwise.  MB 5/9/2024 Lewis presents for follow-up of fecal incontinence and fatty liver.  Florastor and psyllium along with pelvic floor therapy have resolved his fecal incontinence.  His elevated liver enzymes are likely due to fatty liver with negative serologies and fatty deposition on imaging.  Today we have discussed therapy.  He is starting Mounjaro for weight loss.  We will check his labs and a FibroScan, if he has F2 or F3 would consider Rezdiffra , however we may follow his weight loss on Mounjaro first.  MB 11/7/2024 The patient presents for follow-up of nonalcoholic fatty liver disease.  Since his last visit he has not had the fibroscan. He continues to work on healthy weight loss with Mounjaro.  Today denies any new GI complaints.  He continues low-dose psyllium with no further incontinence.  MB 5/8/2025 Aidan presents for follow-up, since his last visit he has been doing well.  He is lost 10 more pounds on Mounjaro.  He did not have the FibroScan as previously ordered, we will schedule this today.  He continues to work on weight loss.  His fecal incontinence is referred resolved with Florastor and pelvic floor therapy.  Today we will recheck his labs and proceed with FibroScan, consider adding Rezdiffera to his Mounjaro regimen pending his workup.  MB

## 2025-05-08 NOTE — PHYSICAL EXAM ORAL CAVITY:
If provider orders tests at today's visit, patient would like to be contacted via Telephone.  If to contact patient by phone, patient's preferred phone # is [phone] (Cell) and it is OK to leave message on voice mail or with family member.  If medications are ordered at today's visit, the pharmacy name/location patient would like them to be sent to is   [id number] - [place], [place] - [address]  [address] [address]  Phone: [phone] Fax: [phone]     mucosa moist , no lesions

## 2025-05-09 LAB
A/G RATIO: 1.5
ABSOLUTE BASOPHILS: 33
ABSOLUTE EOSINOPHILS: 99
ABSOLUTE LYMPHOCYTES: 1940
ABSOLUTE MONOCYTES: 502
ABSOLUTE NEUTROPHILS: 4026
ALBUMIN: 4.4
ALKALINE PHOSPHATASE: 56
ALT (SGPT): 42
AST (SGOT): 24
BASOPHILS: 0.5
BILIRUBIN, TOTAL: 0.9
BUN/CREATININE RATIO: 8
BUN: 12
CALCIUM: 9.8
CARBON DIOXIDE, TOTAL: 27
CHLORIDE: 104
CREATININE: 1.42
EGFR: 61
EOSINOPHILS: 1.5
GLOBULIN, TOTAL: 3
GLUCOSE: 94
HEMATOCRIT: 51
HEMOGLOBIN: 16.8
LYMPHOCYTES: 29.4
MCH: 31.6
MCHC: 32.9
MCV: 96
MONOCYTES: 7.6
MPV: 9.2
NEUTROPHILS: 61
PLATELET COUNT: 250
POTASSIUM: 4.4
PROTEIN, TOTAL: 7.4
RDW: 12.5
RED BLOOD CELL COUNT: 5.31
SODIUM: 140
WHITE BLOOD CELL COUNT: 6.6

## 2025-05-30 ENCOUNTER — OFFICE VISIT (OUTPATIENT)
Dept: URBAN - NONMETROPOLITAN AREA CLINIC 1 | Facility: CLINIC | Age: 45
End: 2025-05-30

## 2025-05-30 RX ORDER — DEXTROAMPHETAMINE SACCHARATE, AMPHETAMINE ASPARTATE MONOHYDRATE, DEXTROAMPHETAMINE SULFATE, AMPHETAMINE SULFATE 5; 5; 5; 5 MG/1; MG/1; MG/1; MG/1
CAPSULE, EXTENDED RELEASE ORAL
Qty: 30 CAPSULE | Status: ACTIVE | COMMUNITY

## 2025-05-30 RX ORDER — TIRZEPATIDE 7.5 MG/.5ML
AS DIRECTED INJECTION, SOLUTION SUBCUTANEOUS
Status: ACTIVE | COMMUNITY

## 2025-05-30 RX ORDER — TESTOSTERONE 10 MG/.5G
1 PUMP TO SKIN IN THE MORNING TO THE THIGHS GEL, METERED TOPICAL ONCE A DAY
Status: ACTIVE | COMMUNITY

## 2025-05-30 RX ORDER — ATORVASTATIN CALCIUM 20 MG/1
TABLET, FILM COATED ORAL
Qty: 30 TABLET | Status: ACTIVE | COMMUNITY

## 2025-05-30 RX ORDER — DEXTROAMPHETAMINE SACCHARATE, AMPHETAMINE ASPARTATE, DEXTROAMPHETAMINE SULFATE, AND AMPHETAMINE SULFATE 5; 5; 5; 5 MG/1; MG/1; MG/1; MG/1
1 TABLET TABLET ORAL TWICE A DAY
Status: ACTIVE | COMMUNITY

## 2025-05-30 RX ORDER — LISINOPRIL 30 MG/1
1 TABLET TABLET ORAL ONCE A DAY
Status: ACTIVE | COMMUNITY

## 2025-05-30 RX ORDER — DEXTROAMPHETAMINE SACCHARATE, AMPHETAMINE ASPARTATE, DEXTROAMPHETAMINE SULFATE, AND AMPHETAMINE SULFATE 2.5; 2.5; 2.5; 2.5 MG/1; MG/1; MG/1; MG/1
1 TABLET TABLET ORAL TWICE A DAY
Status: ACTIVE | COMMUNITY

## 2025-06-02 ENCOUNTER — TELEPHONE ENCOUNTER (OUTPATIENT)
Dept: URBAN - NONMETROPOLITAN AREA CLINIC 2 | Facility: CLINIC | Age: 45
End: 2025-06-02